# Patient Record
Sex: FEMALE | Race: OTHER | Employment: UNEMPLOYED | URBAN - METROPOLITAN AREA
[De-identification: names, ages, dates, MRNs, and addresses within clinical notes are randomized per-mention and may not be internally consistent; named-entity substitution may affect disease eponyms.]

---

## 2017-06-26 PROCEDURE — 36415 COLL VENOUS BLD VENIPUNCTURE: CPT | Performed by: INTERNAL MEDICINE

## 2017-06-26 PROCEDURE — 80061 LIPID PANEL: CPT | Performed by: INTERNAL MEDICINE

## 2017-06-28 PROCEDURE — 88175 CYTOPATH C/V AUTO FLUID REDO: CPT | Performed by: OBSTETRICS & GYNECOLOGY

## 2018-01-19 PROCEDURE — 87086 URINE CULTURE/COLONY COUNT: CPT | Performed by: OBSTETRICS & GYNECOLOGY

## 2018-12-09 PROBLEM — N92.6 IRREGULAR MENSES: Status: ACTIVE | Noted: 2018-12-09

## 2018-12-09 PROBLEM — N90.89 VULVAR SKIN TAG: Status: ACTIVE | Noted: 2018-12-09

## 2018-12-09 PROBLEM — N94.6 DYSMENORRHEA: Status: ACTIVE | Noted: 2018-12-09

## 2018-12-09 PROBLEM — R10.2 PELVIC PAIN: Status: ACTIVE | Noted: 2018-12-09

## 2018-12-09 PROBLEM — N94.10 DYSPAREUNIA, FEMALE: Status: ACTIVE | Noted: 2018-12-09

## 2018-12-27 ENCOUNTER — HOSPITAL ENCOUNTER (EMERGENCY)
Facility: HOSPITAL | Age: 33
Discharge: HOME | End: 2018-12-28
Attending: EMERGENCY MEDICINE
Payer: COMMERCIAL

## 2018-12-27 ENCOUNTER — APPOINTMENT (OUTPATIENT)
Dept: CT IMAGING | Facility: HOSPITAL | Age: 33
End: 2018-12-27
Attending: EMERGENCY MEDICINE
Payer: COMMERCIAL

## 2018-12-27 ENCOUNTER — APPOINTMENT (OUTPATIENT)
Dept: GENERAL RADIOLOGY | Facility: HOSPITAL | Age: 33
End: 2018-12-27
Attending: EMERGENCY MEDICINE
Payer: COMMERCIAL

## 2018-12-27 DIAGNOSIS — R51.9 NONINTRACTABLE HEADACHE, UNSPECIFIED CHRONICITY PATTERN, UNSPECIFIED HEADACHE TYPE: Primary | ICD-10-CM

## 2018-12-27 LAB
ANION GAP SERPL CALC-SCNC: 10 MMOL/L (ref 0–18)
B-HCG UR QL: NEGATIVE
BACTERIA UR QL AUTO: NEGATIVE /HPF
BASOPHILS # BLD: 0 K/UL (ref 0–0.2)
BASOPHILS NFR BLD: 0 %
BILIRUB UR QL: NEGATIVE
BUN SERPL-MCNC: 11 MG/DL (ref 8–20)
BUN/CREAT SERPL: 14.7 (ref 10–20)
CALCIUM SERPL-MCNC: 8.9 MG/DL (ref 8.5–10.5)
CHLORIDE SERPL-SCNC: 101 MMOL/L (ref 95–110)
CLARITY UR: CLEAR
CO2 SERPL-SCNC: 23 MMOL/L (ref 22–32)
COLOR CSF: COLORLESS
COLOR UR: YELLOW
CREAT SERPL-MCNC: 0.75 MG/DL (ref 0.5–1.5)
EOSINOPHIL # BLD: 0.1 K/UL (ref 0–0.7)
EOSINOPHIL NFR BLD: 1 %
ERYTHROCYTE [DISTWIDTH] IN BLOOD BY AUTOMATED COUNT: 13.8 % (ref 11–15)
GLUCOSE CSF-MCNC: 54 MG/DL (ref 40–80)
GLUCOSE SERPL-MCNC: 91 MG/DL (ref 70–99)
GLUCOSE UR-MCNC: NEGATIVE MG/DL
HCT VFR BLD AUTO: 45 % (ref 35–48)
HGB BLD-MCNC: 15.1 G/DL (ref 12–16)
KETONES UR-MCNC: NEGATIVE MG/DL
LEUKOCYTE ESTERASE UR QL STRIP.AUTO: NEGATIVE
LYMPHOCYTES # BLD: 1.8 K/UL (ref 1–4)
LYMPHOCYTES NFR BLD: 21 %
LYMPHOCYTES NFR CSF: 50 % (ref 40–80)
MCH RBC QN AUTO: 31.5 PG (ref 27–32)
MCHC RBC AUTO-ENTMCNC: 33.5 G/DL (ref 32–37)
MCV RBC AUTO: 94 FL (ref 80–100)
MONOCYTES # BLD: 0.5 K/UL (ref 0–1)
MONOCYTES NFR BLD: 6 %
MONOCYTES NFR CSF: 50 % (ref 15–45)
NEUTROPHILS # BLD AUTO: 6.1 K/UL (ref 1.8–7.7)
NEUTROPHILS NFR BLD: 71 %
NEUTROPHILS NFR CSF: 0 % (ref 0–6)
NITRITE UR QL STRIP.AUTO: NEGATIVE
OSMOLALITY UR CALC.SUM OF ELEC: 277 MOSM/KG (ref 275–295)
PH UR: 5 [PH] (ref 5–8)
PLATELET # BLD AUTO: 295 K/UL (ref 140–400)
PMV BLD AUTO: 6.4 FL (ref 7.4–10.3)
POTASSIUM SERPL-SCNC: 3.4 MMOL/L (ref 3.3–5.1)
PROT PATTERN CSF ELPH-IMP: 35 MG/DL (ref 15–45)
PROT UR-MCNC: NEGATIVE MG/DL
RBC # BLD AUTO: 4.79 M/UL (ref 3.7–5.4)
RBC # CSF: 1 /CUMM (ref 0–5)
RBC # CSF: 1 /CUMM (ref 0–5)
RBC #/AREA URNS AUTO: 2 /HPF
SODIUM SERPL-SCNC: 134 MMOL/L (ref 136–144)
SP GR UR STRIP: 1.01 (ref 1–1.03)
TUBE # CSF: 3
TURBIDITY CSF QL: CLEAR
UROBILINOGEN UR STRIP-ACNC: <2
VIT C UR-MCNC: NEGATIVE MG/DL
WBC # BLD AUTO: 8.6 K/UL (ref 4–11)
WBC # CSF: 1 /CUMM (ref 0–5)
WBC #/AREA URNS AUTO: <1 /HPF

## 2018-12-27 PROCEDURE — 70498 CT ANGIOGRAPHY NECK: CPT | Performed by: EMERGENCY MEDICINE

## 2018-12-27 PROCEDURE — 89050 BODY FLUID CELL COUNT: CPT | Performed by: EMERGENCY MEDICINE

## 2018-12-27 PROCEDURE — 87070 CULTURE OTHR SPECIMN AEROBIC: CPT | Performed by: EMERGENCY MEDICINE

## 2018-12-27 PROCEDURE — 89051 BODY FLUID CELL COUNT: CPT | Performed by: EMERGENCY MEDICINE

## 2018-12-27 PROCEDURE — 85025 COMPLETE CBC W/AUTO DIFF WBC: CPT | Performed by: EMERGENCY MEDICINE

## 2018-12-27 PROCEDURE — 81001 URINALYSIS AUTO W/SCOPE: CPT | Performed by: EMERGENCY MEDICINE

## 2018-12-27 PROCEDURE — 85025 COMPLETE CBC W/AUTO DIFF WBC: CPT

## 2018-12-27 PROCEDURE — 62270 DX LMBR SPI PNXR: CPT

## 2018-12-27 PROCEDURE — 77003 FLUOROGUIDE FOR SPINE INJECT: CPT | Performed by: EMERGENCY MEDICINE

## 2018-12-27 PROCEDURE — 82945 GLUCOSE OTHER FLUID: CPT | Performed by: EMERGENCY MEDICINE

## 2018-12-27 PROCEDURE — 80048 BASIC METABOLIC PNL TOTAL CA: CPT

## 2018-12-27 PROCEDURE — 84157 ASSAY OF PROTEIN OTHER: CPT | Performed by: EMERGENCY MEDICINE

## 2018-12-27 PROCEDURE — 62270 DX LMBR SPI PNXR: CPT | Performed by: EMERGENCY MEDICINE

## 2018-12-27 PROCEDURE — 99285 EMERGENCY DEPT VISIT HI MDM: CPT

## 2018-12-27 PROCEDURE — 80048 BASIC METABOLIC PNL TOTAL CA: CPT | Performed by: EMERGENCY MEDICINE

## 2018-12-27 PROCEDURE — 81025 URINE PREGNANCY TEST: CPT

## 2018-12-27 PROCEDURE — 87205 SMEAR GRAM STAIN: CPT | Performed by: EMERGENCY MEDICINE

## 2018-12-27 PROCEDURE — 36415 COLL VENOUS BLD VENIPUNCTURE: CPT

## 2018-12-27 RX ORDER — LIDOCAINE HYDROCHLORIDE 10 MG/ML
20 INJECTION, SOLUTION EPIDURAL; INFILTRATION; INTRACAUDAL; PERINEURAL ONCE
Status: COMPLETED | OUTPATIENT
Start: 2018-12-27 | End: 2018-12-27

## 2018-12-27 RX ORDER — METOCLOPRAMIDE 10 MG/1
10 TABLET ORAL EVERY 6 HOURS PRN
Qty: 20 TABLET | Refills: 0 | Status: SHIPPED | OUTPATIENT
Start: 2018-12-27 | End: 2019-01-01

## 2018-12-27 RX ORDER — LIDOCAINE HYDROCHLORIDE 10 MG/ML
INJECTION, SOLUTION EPIDURAL; INFILTRATION; INTRACAUDAL; PERINEURAL
Status: COMPLETED
Start: 2018-12-27 | End: 2018-12-27

## 2018-12-27 RX ORDER — ACETAMINOPHEN 500 MG
1000 TABLET ORAL ONCE
Status: COMPLETED | OUTPATIENT
Start: 2018-12-27 | End: 2018-12-27

## 2018-12-27 NOTE — ED INITIAL ASSESSMENT (HPI)
Severe headache after lifting weights today. Seen at [place] [address] and ct was done, reported as negative. Sent to er for further evaluation. Still has headache but improved from earlier.

## 2018-12-28 ENCOUNTER — HOSPITAL ENCOUNTER (EMERGENCY)
Facility: HOSPITAL | Age: 33
Discharge: STILL ADMITTED OR ADMINISTRATIVE | End: 2018-12-28
Payer: COMMERCIAL

## 2018-12-28 VITALS
HEIGHT: 62 IN | DIASTOLIC BLOOD PRESSURE: 61 MMHG | RESPIRATION RATE: 20 BRPM | OXYGEN SATURATION: 97 % | HEART RATE: 81 BPM | SYSTOLIC BLOOD PRESSURE: 104 MMHG | WEIGHT: 144 LBS | BODY MASS INDEX: 26.5 KG/M2 | TEMPERATURE: 98 F

## 2018-12-28 NOTE — ED PROVIDER NOTES
Patient Seen in: [facility] AND [facility] Emergency Department    History   Patient presents with:  Headache (neurologic)    Stated Complaint: neck pain/ headache sent from [address] [place] [address]     HPI    27 yo F with PMH seasonal allergies presenting for evaluation of one week of scleroderma, cirrhosis from med, colon problems, arthritis   • Other Brother         gout, eczema, vasovagal syncope   • Psychiatric Maternal Grandfather         EtOHism   • Other Maternal Grandfather         PUD, arthritis   • Other Paternal Grandmother Full/painless EOM. No photophobia. Neck: No meningismus. Cardiovascular: RRR. Pulmonary/Chest: Effort normal. CTAB. Abdominal: Soft. Nontender. Musculoskeletal: No gross deformity. Neurological: Alert. CN II-XII intact.  BUE/BLE proximally and distal None available Indication: 50-year-old female, cervicalgia. Headache. Technique: CT examination of head without intravenous contrast from the cranial vertex to the skull-base.   Automated exposure control and ALARA manual techniques for patient specific dos spur, and normal facets. This results in mild right foraminal stenosis. . C5-6: No significant abnormality. C6-7: No significant abnormality. C7-T1: No significant abnormality.         IMPRESSION: Minor spondylotic changes as described above resulting in mil 22:25          Cta Carotid Arteries (cpt=70498)    Result Date: 12/27/2018  PROCEDURE: CTA CAROTID ARTERIES (CPT=70498)  COMPARISON: None available. INDICATIONS: Neck pain and headache following exercising 1 week previously.  Paternal history of spontaneou hemodynamically significant stenosis, occlusion, or dissection of the internal carotid arteries bilaterally. 2. The vertebral arteries are bilaterally patent without evidence of dissection. 3. Lesser incidental findings as above.      Dictated by (CST): [id number]  Call  For neurologic followup and re-evaluation.       Medications Prescribed:  Discharge Medication List as of 12/28/2018 12:16 AM    START taking these medications    Metoclopramide HCl 10 MG Oral Tab  Take 1 tablet (10 mg total) by mouth ever

## 2018-12-28 NOTE — ED NOTES
Last week at the gym pt felt \"excrutiating\" pain starting at the base of skull radiating up thru top of head. Pain subsided within 30 minutes. Denies LOC, changes to vision, dizziness or nausea, or tingling to extremities.     Since then pt has felt sim

## 2019-02-01 PROCEDURE — 87389 HIV-1 AG W/HIV-1&-2 AB AG IA: CPT | Performed by: OBSTETRICS & GYNECOLOGY

## 2019-02-01 PROCEDURE — 36415 COLL VENOUS BLD VENIPUNCTURE: CPT | Performed by: OBSTETRICS & GYNECOLOGY

## 2019-02-01 PROCEDURE — 86706 HEP B SURFACE ANTIBODY: CPT | Performed by: OBSTETRICS & GYNECOLOGY

## 2019-02-01 PROCEDURE — 86803 HEPATITIS C AB TEST: CPT | Performed by: OBSTETRICS & GYNECOLOGY

## 2019-04-11 PROCEDURE — 88175 CYTOPATH C/V AUTO FLUID REDO: CPT | Performed by: OBSTETRICS & GYNECOLOGY

## (undated) NOTE — ED AVS SNAPSHOT
Court Hickey   MRN: [id number]    Department:  [facility] Emergency Department   Date of Visit:  12/27/2018           Disclosure     Insurance plans vary and the physician(s) referred by the ER may not be covered by your plan.  Please contact CARE PHYSICIAN AT ONCE OR RETURN IMMEDIATELY TO THE EMERGENCY DEPARTMENT. If you have been prescribed any medication(s), please fill your prescription right away and begin taking the medication(s) as directed.   If you believe that any of the medications

## (undated) NOTE — LETTER
[address] [place] [address]  Authorization for Invasive Procedures  1.  I hereby authorize Dr. Rosemarie Isaac , my physician and whomever may be designated as the doctor's assistant, to perform the following operation and/or procedure:  L 5. I consent to the photographing of the operations or procedures to be performed for the purposes of advancing medicine, science, and/or education, provided my identity is not revealed.  If the procedure has been videotaped, the physician/surgeon will obta Witness Signature: ____________________________________________ Date: __________ Time: ___________    Statement of Physician  My signature below affirms that prior to the time of the procedure, I have explained to the patient and/or her legal representativ